# Patient Record
Sex: FEMALE | Race: BLACK OR AFRICAN AMERICAN | NOT HISPANIC OR LATINO | Employment: UNEMPLOYED | URBAN - METROPOLITAN AREA
[De-identification: names, ages, dates, MRNs, and addresses within clinical notes are randomized per-mention and may not be internally consistent; named-entity substitution may affect disease eponyms.]

---

## 2020-02-17 PROBLEM — Z98.891 S/P C-SECTION: Status: ACTIVE | Noted: 2020-02-17

## 2020-02-17 PROBLEM — Z3A.01 6 WEEKS GESTATION OF PREGNANCY: Status: ACTIVE | Noted: 2020-02-17

## 2020-02-17 PROBLEM — O36.4XX0 IUFD AT 20 WEEKS OR MORE OF GESTATION: Status: ACTIVE | Noted: 2020-02-17

## 2020-03-03 PROBLEM — Z87.59 HISTORY OF PRIOR PREGNANCY WITH SMALL FOR GESTATIONAL AGE NEWBORN: Status: ACTIVE | Noted: 2020-03-03

## 2020-03-03 PROBLEM — Z3A.08 8 WEEKS GESTATION OF PREGNANCY: Status: ACTIVE | Noted: 2020-02-17

## 2020-03-03 PROBLEM — Z98.891 HISTORY OF LOW TRANSVERSE CESAREAN SECTION: Status: ACTIVE | Noted: 2020-03-03

## 2020-04-06 ENCOUNTER — SOCIAL WORK (OUTPATIENT)
Dept: ADMINISTRATIVE | Facility: OTHER | Age: 35
End: 2020-04-06

## 2020-04-06 PROBLEM — Z3A.13 13 WEEKS GESTATION OF PREGNANCY: Status: ACTIVE | Noted: 2020-02-17

## 2020-04-06 PROBLEM — A59.01 TRICHOMONAL VAGINITIS: Status: ACTIVE | Noted: 2020-04-06

## 2020-04-06 PROBLEM — Z28.21 INFLUENZA VACCINE REFUSED: Status: ACTIVE | Noted: 2020-04-06

## 2020-04-06 PROBLEM — O09.92 SUPERVISION OF HIGH RISK PREGNANCY IN SECOND TRIMESTER: Status: ACTIVE | Noted: 2020-04-06

## 2020-04-06 NOTE — PROGRESS NOTES
MD sent MIREYA a message regarding pt unable to get her aspirin prescription due to issurance issue.MIREYA met with pt regarding her medicaid status;per pt they moved and drop her prior medicaid provider apply for [place] medicaid. MIREYA made phone call to pt's mother(Lazara BaltazarBfypi-[phone]) regarding daughter medicaid status;per pt's mother they drop the prior medication provide and apply for [place].medicaid.MIREYA spoke with the financial assistance office and there is no application on file. MIREYA escorted pt to the financial assistance to apply for medicaid. MIREYA later made phone call to pt's mother regarding the above;per pt's mother will  aspirin prescription. No other needs identified at this time.    Daya Villegas,MSW  Pager#[phone]

## 2020-05-14 PROBLEM — O28.3 ABNORMAL FETAL ULTRASOUND: Status: ACTIVE | Noted: 2020-05-14

## 2020-06-29 PROBLEM — O26.12 LOW WEIGHT GAIN DURING PREGNANCY IN SECOND TRIMESTER: Status: ACTIVE | Noted: 2020-06-29

## 2020-07-17 PROBLEM — O36.5920 POOR FETAL GROWTH AFFECTING MANAGEMENT OF MOTHER IN SECOND TRIMESTER: Status: ACTIVE | Noted: 2020-07-17

## 2020-07-20 ENCOUNTER — SOCIAL WORK (OUTPATIENT)
Dept: ADMINISTRATIVE | Facility: OTHER | Age: 35
End: 2020-07-20

## 2020-07-20 PROBLEM — B00.9 HSV-2 (HERPES SIMPLEX VIRUS 2) INFECTION: Status: ACTIVE | Noted: 2020-07-20

## 2020-07-20 PROBLEM — O36.5930 POOR FETAL GROWTH AFFECTING MANAGEMENT OF MOTHER IN THIRD TRIMESTER: Status: ACTIVE | Noted: 2020-07-17

## 2020-07-20 PROBLEM — O36.5930 POOR FETAL GROWTH AFFECTING MANAGEMENT OF MOTHER IN THIRD TRIMESTER: Status: RESOLVED | Noted: 2020-07-17 | Resolved: 2020-07-20

## 2020-07-20 PROBLEM — O36.5930 POOR FETAL GROWTH AFFECTING MANAGEMENT OF MOTHER IN THIRD TRIMESTER: Status: ACTIVE | Noted: 2020-07-20

## 2020-07-20 PROBLEM — Z3A.28 28 WEEKS GESTATION OF PREGNANCY: Status: ACTIVE | Noted: 2020-02-17

## 2020-07-20 NOTE — PROGRESS NOTES
MIREYA received paperwork from [facility] regarding  prior authorization for pt's medication(Metrogel). SW completed demographic sheet of the prior authorization form and gave the form to MD for review/signature.MIREYA later received pt's completed prior authorization paperwork from MD and fax to([phone])[facility]. No other needs identified at this time.    Daya Villegas MSW  Pager#:[phone]

## 2020-07-21 ENCOUNTER — SOCIAL WORK (OUTPATIENT)
Dept: ADMINISTRATIVE | Facility: OTHER | Age: 35
End: 2020-07-21

## 2020-07-21 NOTE — PROGRESS NOTES
MIREYA received paperwork from pt's insurance regarding a prior authorization for pt's medication(metrronidazole);per paperwork a prior authorization is not need because this medication is cover under the brand name. MIREYA spoke with [facility] informed them that pt's medication is cover under the brand name. MIREYA was informed by pharmacy representative that pt medication will be order today and pt can  tomorrow after 12 noon. MIREYA made phone call to pt([phone])unable to reach left a message regarding the above. MIREYA scanned paperwork into epic.No other needs identified at this time.    Daya Villegas,MSW  Pager#[phone]

## 2020-08-11 PROBLEM — O99.810 ABNORMAL O'SULLIVAN GLUCOSE CHALLENGE TEST, ANTEPARTUM: Status: ACTIVE | Noted: 2020-08-11

## 2020-08-14 PROBLEM — R10.9 ABDOMINAL PAIN AFFECTING PREGNANCY: Status: ACTIVE | Noted: 2020-08-14

## 2020-08-14 PROBLEM — O99.333 PREGNANCY COMPLICATED BY TOBACCO USE IN THIRD TRIMESTER: Status: ACTIVE | Noted: 2020-08-14

## 2020-08-14 PROBLEM — Z3A.31 31 WEEKS GESTATION OF PREGNANCY: Status: ACTIVE | Noted: 2020-02-17

## 2020-08-14 PROBLEM — O26.899 ABDOMINAL PAIN AFFECTING PREGNANCY: Status: RESOLVED | Noted: 2020-08-14 | Resolved: 2020-08-14

## 2020-08-14 PROBLEM — R10.9 ABDOMINAL PAIN AFFECTING PREGNANCY: Status: RESOLVED | Noted: 2020-08-14 | Resolved: 2020-08-14

## 2020-08-14 PROBLEM — O26.899 ABDOMINAL PAIN AFFECTING PREGNANCY: Status: ACTIVE | Noted: 2020-08-14

## 2020-08-15 PROBLEM — Z30.2 REQUEST FOR STERILIZATION: Status: ACTIVE | Noted: 2020-08-15

## 2020-08-25 PROBLEM — Z3A.33 33 WEEKS GESTATION OF PREGNANCY: Status: ACTIVE | Noted: 2020-02-17

## 2020-09-04 PROBLEM — Z3A.34 34 WEEKS GESTATION OF PREGNANCY: Status: ACTIVE | Noted: 2020-02-17

## 2020-09-04 PROBLEM — O36.5990 IUGR, ANTENATAL: Status: ACTIVE | Noted: 2020-09-04

## 2020-09-05 PROBLEM — Z3A.34 34 WEEKS GESTATION OF PREGNANCY: Status: RESOLVED | Noted: 2020-02-17 | Resolved: 2020-09-05

## 2020-10-05 PROBLEM — F33.1 MDD (MAJOR DEPRESSIVE DISORDER), RECURRENT EPISODE, MODERATE: Status: ACTIVE | Noted: 2020-10-05

## 2020-11-16 PROBLEM — O36.5930 POOR FETAL GROWTH AFFECTING MANAGEMENT OF MOTHER IN THIRD TRIMESTER: Status: RESOLVED | Noted: 2020-07-20 | Resolved: 2020-11-16

## 2020-11-23 PROBLEM — F12.11 HISTORY OF CANNABIS ABUSE: Status: ACTIVE | Noted: 2020-11-23
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
## 2021-09-23 PROBLEM — F41.9 ANXIETY DISORDER, UNSPECIFIED: Status: ACTIVE | Noted: 2021-09-23

## 2021-09-23 PROBLEM — F33.1 MDD (MAJOR DEPRESSIVE DISORDER), RECURRENT EPISODE, MODERATE: Status: RESOLVED | Noted: 2020-10-05 | Resolved: 2021-09-23

## 2021-09-23 PROBLEM — F33.2 SEVERE EPISODE OF RECURRENT MAJOR DEPRESSIVE DISORDER, WITHOUT PSYCHOTIC FEATURES: Status: ACTIVE | Noted: 2021-09-23

## 2022-04-14 PROBLEM — M79.602 LEFT ARM PAIN: Chronic | Status: ACTIVE | Noted: 2022-04-14
